# Patient Record
Sex: MALE | Race: WHITE | NOT HISPANIC OR LATINO | URBAN - METROPOLITAN AREA
[De-identification: names, ages, dates, MRNs, and addresses within clinical notes are randomized per-mention and may not be internally consistent; named-entity substitution may affect disease eponyms.]

---

## 2019-09-30 ENCOUNTER — NEW PATIENT (OUTPATIENT)
Dept: URBAN - METROPOLITAN AREA CLINIC 85 | Facility: CLINIC | Age: 84
End: 2019-09-30
Payer: COMMERCIAL

## 2019-09-30 DIAGNOSIS — H43.392 OTHER VITREOUS OPACITIES, LEFT EYE: ICD-10-CM

## 2019-09-30 DIAGNOSIS — H43.811 VITREOUS DEGENERATION, RIGHT EYE: ICD-10-CM

## 2019-09-30 PROCEDURE — 92134 CPTRZ OPH DX IMG PST SGM RTA: CPT | Performed by: OPTOMETRIST

## 2019-09-30 PROCEDURE — 92004 COMPRE OPH EXAM NEW PT 1/>: CPT | Performed by: OPTOMETRIST

## 2019-09-30 ASSESSMENT — KERATOMETRY
OS: 42.88
OD: 43.13

## 2019-09-30 ASSESSMENT — INTRAOCULAR PRESSURE
OS: 16
OD: 14

## 2019-09-30 ASSESSMENT — VISUAL ACUITY
OD: 20/70
OS: 20/50

## 2019-10-02 ENCOUNTER — NEW PATIENT (OUTPATIENT)
Dept: URBAN - METROPOLITAN AREA CLINIC 85 | Facility: CLINIC | Age: 84
End: 2019-10-02
Payer: COMMERCIAL

## 2019-10-02 DIAGNOSIS — H25.13 AGE-RELATED NUCLEAR CATARACT, BILATERAL: ICD-10-CM

## 2019-10-02 DIAGNOSIS — H52.223 REGULAR ASTIGMATISM, BILATERAL: ICD-10-CM

## 2019-10-02 DIAGNOSIS — H25.23 AGE-RELATED CATARACT, MORGANIAN TYPE, BILATERAL: Primary | ICD-10-CM

## 2019-10-02 PROCEDURE — 92002 INTRM OPH EXAM NEW PATIENT: CPT | Performed by: OPHTHALMOLOGY

## 2019-10-02 RX ORDER — KETOROLAC TROMETHAMINE 5 MG/ML
0.5 % SOLUTION OPHTHALMIC
Qty: 1 | Refills: 1 | Status: INACTIVE
Start: 2019-10-02 | End: 2019-12-30

## 2019-10-02 ASSESSMENT — VISUAL ACUITY
OD: 20/70
OS: 20/50

## 2019-10-02 ASSESSMENT — KERATOMETRY
OS: 42.88
OD: 43.13

## 2019-10-02 ASSESSMENT — INTRAOCULAR PRESSURE
OS: 17
OD: 15

## 2019-10-15 ENCOUNTER — Encounter (OUTPATIENT)
Dept: URBAN - METROPOLITAN AREA CLINIC 85 | Facility: CLINIC | Age: 84
End: 2019-10-15
Payer: COMMERCIAL

## 2019-10-15 DIAGNOSIS — Z01.818 ENCOUNTER FOR OTHER PREPROCEDURAL EXAMINATION: Primary | ICD-10-CM

## 2019-10-15 PROCEDURE — 99213 OFFICE O/P EST LOW 20 MIN: CPT | Performed by: PHYSICIAN ASSISTANT

## 2019-10-28 ENCOUNTER — SURGERY (OUTPATIENT)
Dept: URBAN - METROPOLITAN AREA SURGERY 55 | Facility: SURGERY | Age: 84
End: 2019-10-28
Payer: COMMERCIAL

## 2019-10-28 PROCEDURE — 66982 XCAPSL CTRC RMVL CPLX WO ECP: CPT | Performed by: OPHTHALMOLOGY

## 2019-10-29 ENCOUNTER — POST OP (OUTPATIENT)
Dept: URBAN - METROPOLITAN AREA CLINIC 85 | Facility: CLINIC | Age: 84
End: 2019-10-29

## 2019-10-29 DIAGNOSIS — Z96.1 PRESENCE OF INTRAOCULAR LENS: Primary | ICD-10-CM

## 2019-10-29 PROCEDURE — 99024 POSTOP FOLLOW-UP VISIT: CPT | Performed by: PHYSICIAN ASSISTANT

## 2019-10-29 ASSESSMENT — INTRAOCULAR PRESSURE: OD: 18

## 2019-11-04 ENCOUNTER — POST OP (OUTPATIENT)
Dept: URBAN - METROPOLITAN AREA CLINIC 85 | Facility: CLINIC | Age: 84
End: 2019-11-04

## 2019-11-04 PROCEDURE — 99024 POSTOP FOLLOW-UP VISIT: CPT | Performed by: OPTOMETRIST

## 2019-11-04 ASSESSMENT — VISUAL ACUITY
OS: 20/50
OD: 20/200

## 2019-11-04 ASSESSMENT — INTRAOCULAR PRESSURE
OD: 17
OS: 17

## 2019-12-02 ENCOUNTER — FOLLOW UP ESTABLISHED (OUTPATIENT)
Dept: URBAN - METROPOLITAN AREA CLINIC 85 | Facility: CLINIC | Age: 84
End: 2019-12-02
Payer: COMMERCIAL

## 2019-12-02 ENCOUNTER — POST OP (OUTPATIENT)
Dept: URBAN - METROPOLITAN AREA CLINIC 85 | Facility: CLINIC | Age: 84
End: 2019-12-02

## 2019-12-02 PROCEDURE — 67028 INJECTION EYE DRUG: CPT | Performed by: OPHTHALMOLOGY

## 2019-12-02 PROCEDURE — 92014 COMPRE OPH EXAM EST PT 1/>: CPT | Performed by: OPHTHALMOLOGY

## 2019-12-02 PROCEDURE — 99024 POSTOP FOLLOW-UP VISIT: CPT | Performed by: OPTOMETRIST

## 2019-12-02 PROCEDURE — 92134 CPTRZ OPH DX IMG PST SGM RTA: CPT | Performed by: OPHTHALMOLOGY

## 2019-12-02 ASSESSMENT — INTRAOCULAR PRESSURE
OD: 13
OS: 20
OD: 13
OS: 20

## 2019-12-02 ASSESSMENT — VISUAL ACUITY
OD: 20/150
OS: 20/50

## 2019-12-30 ENCOUNTER — FOLLOW UP ESTABLISHED (OUTPATIENT)
Dept: URBAN - METROPOLITAN AREA CLINIC 85 | Facility: CLINIC | Age: 84
End: 2019-12-30
Payer: COMMERCIAL

## 2019-12-30 DIAGNOSIS — H35.3211 EXDTVE AGE-REL MCLR DEGN, RIGHT EYE, WITH ACTV CHRDL NEOVAS: Primary | ICD-10-CM

## 2019-12-30 PROCEDURE — 67028 INJECTION EYE DRUG: CPT | Performed by: OPHTHALMOLOGY

## 2019-12-30 ASSESSMENT — INTRAOCULAR PRESSURE
OS: 20
OD: 14

## 2020-02-10 ENCOUNTER — FOLLOW UP ESTABLISHED (OUTPATIENT)
Dept: URBAN - METROPOLITAN AREA CLINIC 85 | Facility: CLINIC | Age: 85
End: 2020-02-10
Payer: COMMERCIAL

## 2020-02-10 PROCEDURE — 67028 INJECTION EYE DRUG: CPT | Performed by: OPHTHALMOLOGY

## 2020-02-10 ASSESSMENT — INTRAOCULAR PRESSURE
OD: 15
OS: 19

## 2020-03-09 ENCOUNTER — FOLLOW UP ESTABLISHED (OUTPATIENT)
Dept: URBAN - METROPOLITAN AREA CLINIC 85 | Facility: CLINIC | Age: 85
End: 2020-03-09
Payer: COMMERCIAL

## 2020-03-09 PROCEDURE — 67028 INJECTION EYE DRUG: CPT | Performed by: OPHTHALMOLOGY

## 2020-03-09 ASSESSMENT — INTRAOCULAR PRESSURE
OD: 16
OS: 21

## 2020-05-18 ENCOUNTER — FOLLOW UP ESTABLISHED (OUTPATIENT)
Dept: URBAN - METROPOLITAN AREA CLINIC 85 | Facility: CLINIC | Age: 85
End: 2020-05-18
Payer: COMMERCIAL

## 2020-05-18 PROCEDURE — 67028 INJECTION EYE DRUG: CPT | Performed by: OPHTHALMOLOGY

## 2020-05-18 PROCEDURE — 92134 CPTRZ OPH DX IMG PST SGM RTA: CPT | Performed by: OPHTHALMOLOGY

## 2020-05-18 PROCEDURE — 92014 COMPRE OPH EXAM EST PT 1/>: CPT | Performed by: OPHTHALMOLOGY

## 2020-05-18 ASSESSMENT — INTRAOCULAR PRESSURE
OS: 20
OD: 18

## 2020-06-29 ENCOUNTER — FOLLOW UP ESTABLISHED (OUTPATIENT)
Dept: URBAN - METROPOLITAN AREA CLINIC 85 | Facility: CLINIC | Age: 85
End: 2020-06-29
Payer: COMMERCIAL

## 2020-06-29 PROCEDURE — 67028 INJECTION EYE DRUG: CPT | Performed by: OPHTHALMOLOGY

## 2020-06-29 ASSESSMENT — INTRAOCULAR PRESSURE
OS: 16
OD: 12

## 2020-08-03 ENCOUNTER — FOLLOW UP ESTABLISHED (OUTPATIENT)
Dept: URBAN - METROPOLITAN AREA CLINIC 85 | Facility: CLINIC | Age: 85
End: 2020-08-03
Payer: COMMERCIAL

## 2020-08-03 PROCEDURE — 67028 INJECTION EYE DRUG: CPT | Performed by: OPHTHALMOLOGY

## 2020-08-03 ASSESSMENT — INTRAOCULAR PRESSURE
OS: 19
OD: 14

## 2020-08-31 ENCOUNTER — FOLLOW UP ESTABLISHED (OUTPATIENT)
Dept: URBAN - METROPOLITAN AREA CLINIC 85 | Facility: CLINIC | Age: 85
End: 2020-08-31
Payer: COMMERCIAL

## 2020-08-31 PROCEDURE — 67028 INJECTION EYE DRUG: CPT | Performed by: OPHTHALMOLOGY

## 2020-08-31 ASSESSMENT — INTRAOCULAR PRESSURE
OD: 15
OS: 20

## 2020-10-05 ENCOUNTER — FOLLOW UP ESTABLISHED (OUTPATIENT)
Dept: URBAN - METROPOLITAN AREA CLINIC 85 | Facility: CLINIC | Age: 85
End: 2020-10-05
Payer: COMMERCIAL

## 2020-10-05 DIAGNOSIS — H25.12 AGE-RELATED NUCLEAR CATARACT, LEFT EYE: ICD-10-CM

## 2020-10-05 PROCEDURE — 92014 COMPRE OPH EXAM EST PT 1/>: CPT | Performed by: OPHTHALMOLOGY

## 2020-10-05 PROCEDURE — 92134 CPTRZ OPH DX IMG PST SGM RTA: CPT | Performed by: OPHTHALMOLOGY

## 2020-10-05 PROCEDURE — 67028 INJECTION EYE DRUG: CPT | Performed by: OPHTHALMOLOGY

## 2020-10-05 ASSESSMENT — INTRAOCULAR PRESSURE
OS: 18
OD: 16

## 2020-10-14 ENCOUNTER — FOLLOW UP ESTABLISHED (OUTPATIENT)
Dept: URBAN - METROPOLITAN AREA CLINIC 85 | Facility: CLINIC | Age: 85
End: 2020-10-14
Payer: COMMERCIAL

## 2020-10-14 DIAGNOSIS — H25.22 AGE-RELATED CATARACT, MORGANIAN TYPE, LEFT EYE: Primary | ICD-10-CM

## 2020-10-14 PROCEDURE — 92014 COMPRE OPH EXAM EST PT 1/>: CPT | Performed by: OPHTHALMOLOGY

## 2020-10-14 RX ORDER — KETOROLAC TROMETHAMINE 5 MG/ML
0.5 % SOLUTION OPHTHALMIC
Qty: 1 | Refills: 1 | Status: INACTIVE
Start: 2020-10-14 | End: 2021-02-15

## 2020-10-14 ASSESSMENT — VISUAL ACUITY
OD: 20/400
OS: 20/40

## 2020-10-14 ASSESSMENT — INTRAOCULAR PRESSURE
OS: 17
OD: 16

## 2020-10-14 ASSESSMENT — KERATOMETRY: OS: 42.75

## 2020-11-13 ENCOUNTER — Encounter (OUTPATIENT)
Dept: URBAN - METROPOLITAN AREA CLINIC 85 | Facility: CLINIC | Age: 85
End: 2020-11-13
Payer: COMMERCIAL

## 2020-11-13 PROCEDURE — 99213 OFFICE O/P EST LOW 20 MIN: CPT | Performed by: PHYSICIAN ASSISTANT

## 2020-11-16 ENCOUNTER — FOLLOW UP ESTABLISHED (OUTPATIENT)
Dept: URBAN - METROPOLITAN AREA CLINIC 85 | Facility: CLINIC | Age: 85
End: 2020-11-16
Payer: COMMERCIAL

## 2020-11-16 PROCEDURE — 67028 INJECTION EYE DRUG: CPT | Performed by: OPHTHALMOLOGY

## 2020-11-16 ASSESSMENT — INTRAOCULAR PRESSURE
OD: 14
OS: 18

## 2020-11-23 ENCOUNTER — SURGERY (OUTPATIENT)
Dept: URBAN - METROPOLITAN AREA SURGERY 55 | Facility: SURGERY | Age: 85
End: 2020-11-23
Payer: COMMERCIAL

## 2020-11-23 PROCEDURE — 66982 XCAPSL CTRC RMVL CPLX WO ECP: CPT | Performed by: OPHTHALMOLOGY

## 2020-11-24 ENCOUNTER — POST OP (OUTPATIENT)
Dept: URBAN - METROPOLITAN AREA CLINIC 85 | Facility: CLINIC | Age: 85
End: 2020-11-24
Payer: COMMERCIAL

## 2020-11-24 PROCEDURE — 99024 POSTOP FOLLOW-UP VISIT: CPT | Performed by: PHYSICIAN ASSISTANT

## 2020-11-24 ASSESSMENT — INTRAOCULAR PRESSURE: OS: 18

## 2020-12-14 ENCOUNTER — POST OP (OUTPATIENT)
Dept: URBAN - METROPOLITAN AREA CLINIC 85 | Facility: CLINIC | Age: 85
End: 2020-12-14
Payer: COMMERCIAL

## 2020-12-14 PROCEDURE — 99024 POSTOP FOLLOW-UP VISIT: CPT | Performed by: OPTOMETRIST

## 2020-12-14 ASSESSMENT — INTRAOCULAR PRESSURE
OD: 15
OS: 16

## 2020-12-14 ASSESSMENT — VISUAL ACUITY: OS: 20/25

## 2020-12-28 ENCOUNTER — FOLLOW UP ESTABLISHED (OUTPATIENT)
Dept: URBAN - METROPOLITAN AREA CLINIC 85 | Facility: CLINIC | Age: 85
End: 2020-12-28
Payer: COMMERCIAL

## 2020-12-28 PROCEDURE — 67028 INJECTION EYE DRUG: CPT | Performed by: OPHTHALMOLOGY

## 2020-12-28 ASSESSMENT — INTRAOCULAR PRESSURE
OS: 14
OD: 13

## 2021-02-15 ENCOUNTER — FOLLOW UP ESTABLISHED (OUTPATIENT)
Dept: URBAN - METROPOLITAN AREA CLINIC 85 | Facility: CLINIC | Age: 86
End: 2021-02-15
Payer: COMMERCIAL

## 2021-02-15 PROCEDURE — 92014 COMPRE OPH EXAM EST PT 1/>: CPT | Performed by: OPHTHALMOLOGY

## 2021-02-15 PROCEDURE — 92134 CPTRZ OPH DX IMG PST SGM RTA: CPT | Performed by: OPHTHALMOLOGY

## 2021-02-15 ASSESSMENT — INTRAOCULAR PRESSURE
OS: 13
OD: 13

## 2021-08-30 ENCOUNTER — OFFICE VISIT (OUTPATIENT)
Dept: URBAN - METROPOLITAN AREA CLINIC 85 | Facility: CLINIC | Age: 86
End: 2021-08-30
Payer: COMMERCIAL

## 2021-08-30 PROCEDURE — 67028 INJECTION EYE DRUG: CPT | Performed by: OPHTHALMOLOGY

## 2021-08-30 ASSESSMENT — INTRAOCULAR PRESSURE
OD: 12
OS: 13

## 2021-08-30 NOTE — IMPRESSION/PLAN
Impression: Exudative macular degeneration, with active choroidal neovascularization, right eye Plan: Exam & OCT eval. confirm the presence of neovascular-age related macular degeneration. Findings were discussed w/ pt & we suggest a series of anti-VEGF treatments. After a careful discuss. of r/b of tx, pt elected to proceed with intravitreal anti-VEGF therapy. An intravitreal Avastin injection was administered without complication OD. Pt to rtrn in 4-6 weeks for a repeat Avastin injection OD.

## 2021-10-25 ENCOUNTER — PROCEDURE (OUTPATIENT)
Dept: URBAN - METROPOLITAN AREA CLINIC 85 | Facility: CLINIC | Age: 86
End: 2021-10-25
Payer: COMMERCIAL

## 2021-10-25 PROCEDURE — 67028 INJECTION EYE DRUG: CPT | Performed by: OPHTHALMOLOGY

## 2021-10-25 ASSESSMENT — INTRAOCULAR PRESSURE
OS: 13
OD: 11

## 2021-10-25 NOTE — IMPRESSION/PLAN
Impression: Exudative macular degeneration, with active choroidal neovascularization, right eye Plan: An intravitreal Avastin injection was administered without complication OD. Pt to rtrn in 4-6 weeks for a repeat Avastin injection OD.

## 2021-12-09 ENCOUNTER — OFFICE VISIT (OUTPATIENT)
Dept: URBAN - METROPOLITAN AREA CLINIC 85 | Facility: CLINIC | Age: 86
End: 2021-12-09
Payer: COMMERCIAL

## 2021-12-09 PROCEDURE — 67028 INJECTION EYE DRUG: CPT | Performed by: OPHTHALMOLOGY

## 2021-12-09 ASSESSMENT — INTRAOCULAR PRESSURE
OS: 14
OD: 12

## 2021-12-09 NOTE — IMPRESSION/PLAN
Impression: Exudative macular degeneration, with active choroidal neovascularization, right eye Plan: An intravitreal Avastin injection was administered without complication OD. Pt to rtrn in 4-6 weeks for a dilated follow up and possible oct.

## 2022-01-17 ENCOUNTER — OFFICE VISIT (OUTPATIENT)
Dept: URBAN - METROPOLITAN AREA CLINIC 85 | Facility: CLINIC | Age: 87
End: 2022-01-17
Payer: COMMERCIAL

## 2022-01-17 DIAGNOSIS — H33.051 TOTAL RETINAL DETACHMENT, RIGHT EYE: ICD-10-CM

## 2022-01-17 PROCEDURE — 99214 OFFICE O/P EST MOD 30 MIN: CPT | Performed by: OPHTHALMOLOGY

## 2022-01-17 PROCEDURE — 92134 CPTRZ OPH DX IMG PST SGM RTA: CPT | Performed by: OPHTHALMOLOGY

## 2022-01-17 ASSESSMENT — INTRAOCULAR PRESSURE
OD: 13
OS: 14

## 2022-01-17 NOTE — IMPRESSION/PLAN
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 Plan: The exam and oct show that the patient has a total retinal detachment OD. The findings were discussed with the patient, along with the risks and benefits of a vitrectomy/ laser/air vs oil OD and the patient will be scheduled for surgery in the near future. 24318- PPVIT/laser/air vs oil OD.